# Patient Record
Sex: FEMALE | Race: WHITE
[De-identification: names, ages, dates, MRNs, and addresses within clinical notes are randomized per-mention and may not be internally consistent; named-entity substitution may affect disease eponyms.]

---

## 2022-07-17 ENCOUNTER — HOSPITAL ENCOUNTER (EMERGENCY)
Dept: HOSPITAL 26 - MED | Age: 77
Discharge: HOME | End: 2022-07-17
Payer: COMMERCIAL

## 2022-07-17 VITALS — HEIGHT: 64 IN | BODY MASS INDEX: 31.07 KG/M2 | WEIGHT: 182 LBS

## 2022-07-17 VITALS — SYSTOLIC BLOOD PRESSURE: 151 MMHG | DIASTOLIC BLOOD PRESSURE: 78 MMHG

## 2022-07-17 VITALS — DIASTOLIC BLOOD PRESSURE: 58 MMHG | SYSTOLIC BLOOD PRESSURE: 128 MMHG

## 2022-07-17 DIAGNOSIS — L29.9: Primary | ICD-10-CM

## 2022-07-17 DIAGNOSIS — Z79.899: ICD-10-CM

## 2022-07-17 NOTE — NUR
76/F PRESENTS TO ED WITH C/O GENERALIZED ITCHING TO HEAD AND BODY SINCE 
YESTERDAY, STATES  HAS SAME SYMPTOMS IN THE HOME. NO RASH NOTED, DENIES 
USE OF NEW FOOD OR BODY PRODUCTS.

## 2022-07-17 NOTE — NUR
Patient discharged with v/s stable. Written and verbal after care instructions 
ABOUT PRURITUS given and explained. Patient alert, oriented and verbalized 
understanding of instructions. Ambulatory with steady gait. All questions 
addressed prior to discharge. ID band removed. Patient advised to follow up 
with PMD. Rx of ATARAX HCL given. Patient educated on indication of medication 
including possible reaction and side effects. Opportunity to ask questions 
provided and answered.